# Patient Record
Sex: FEMALE | Race: BLACK OR AFRICAN AMERICAN | NOT HISPANIC OR LATINO | URBAN - METROPOLITAN AREA
[De-identification: names, ages, dates, MRNs, and addresses within clinical notes are randomized per-mention and may not be internally consistent; named-entity substitution may affect disease eponyms.]

---

## 2024-05-16 ENCOUNTER — EMERGENCY (EMERGENCY)
Facility: HOSPITAL | Age: 34
LOS: 1 days | Discharge: HOME | End: 2024-05-16
Attending: EMERGENCY MEDICINE
Payer: COMMERCIAL

## 2024-05-16 VITALS
HEART RATE: 81 BPM | RESPIRATION RATE: 18 BRPM | SYSTOLIC BLOOD PRESSURE: 155 MMHG | HEIGHT: 63 IN | OXYGEN SATURATION: 100 % | WEIGHT: 100.09 LBS | TEMPERATURE: 98 F | DIASTOLIC BLOOD PRESSURE: 78 MMHG

## 2024-05-16 PROCEDURE — 99284 EMERGENCY DEPT VISIT MOD MDM: CPT

## 2024-05-17 VITALS
OXYGEN SATURATION: 100 % | HEART RATE: 75 BPM | DIASTOLIC BLOOD PRESSURE: 78 MMHG | SYSTOLIC BLOOD PRESSURE: 122 MMHG | RESPIRATION RATE: 16 BRPM | TEMPERATURE: 98 F

## 2024-05-17 PROCEDURE — 73610 X-RAY EXAM OF ANKLE: CPT | Mod: 26,LT

## 2024-05-17 PROCEDURE — 90471 IMMUNIZATION ADMIN: CPT

## 2024-05-17 PROCEDURE — 99284 EMERGENCY DEPT VISIT MOD MDM: CPT | Mod: 25

## 2024-05-17 PROCEDURE — 73610 X-RAY EXAM OF ANKLE: CPT

## 2024-05-17 PROCEDURE — 90715 TDAP VACCINE 7 YRS/> IM: CPT

## 2024-05-17 PROCEDURE — 73590 X-RAY EXAM OF LOWER LEG: CPT | Mod: 26,LT

## 2024-05-17 PROCEDURE — 73590 X-RAY EXAM OF LOWER LEG: CPT

## 2024-05-17 RX ORDER — TETANUS TOXOID, REDUCED DIPHTHERIA TOXOID AND ACELLULAR PERTUSSIS VACCINE, ADSORBED 5; 2.5; 8; 8; 2.5 [IU]/.5ML; [IU]/.5ML; UG/.5ML; UG/.5ML; UG/.5ML
0.5 SUSPENSION INTRAMUSCULAR ONCE
Refills: 0 | Status: COMPLETED | OUTPATIENT
Start: 2024-05-17 | End: 2024-05-17

## 2024-05-17 RX ORDER — ACETAMINOPHEN 500 MG
975 TABLET ORAL ONCE
Refills: 0 | Status: COMPLETED | OUTPATIENT
Start: 2024-05-17 | End: 2024-05-17

## 2024-05-17 RX ORDER — IBUPROFEN 200 MG
400 TABLET ORAL ONCE
Refills: 0 | Status: DISCONTINUED | OUTPATIENT
Start: 2024-05-17 | End: 2024-05-17

## 2024-05-17 RX ADMIN — Medication 975 MILLIGRAM(S): at 01:56

## 2024-05-17 RX ADMIN — Medication 1 TABLET(S): at 01:06

## 2024-05-17 RX ADMIN — TETANUS TOXOID, REDUCED DIPHTHERIA TOXOID AND ACELLULAR PERTUSSIS VACCINE, ADSORBED 0.5 MILLILITER(S): 5; 2.5; 8; 8; 2.5 SUSPENSION INTRAMUSCULAR at 01:07

## 2024-05-17 NOTE — ED PROVIDER NOTE - CLINICAL SUMMARY MEDICAL DECISION MAKING FREE TEXT BOX
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  Patient notes she has pain in the ankle   With some swelling there.   Denies any fall, no LOC, no head trauma.    Low suspicion for fractures but will get x-rays.  Will start with Augmentin and tetanus update.  Dany with patient low suspicion for rabies as dog's in [place] do not carry this.  Patient notes she is okay with plan. 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  Patient notes she has pain in the ankle   With some swelling there.   Denies any fall, no LOC, no head trauma.    Low suspicion for fractures but will get x-rays.  Will start with Augmentin and tetanus update.  Dany with patient low suspicion for rabies as dog's in [place] do not carry this.  Patient notes she is okay with plan.    Dr. Perez (Attending Physician)

## 2024-05-17 NOTE — ED ADULT NURSE NOTE - NSFALLUNIVINTERV_ED_ALL_ED
Bed/Stretcher in lowest position, wheels locked, appropriate side rails in place/Call bell, personal items and telephone in reach/Instruct patient to call for assistance before getting out of bed/chair/stretcher/Non-slip footwear applied when patient is off stretcher/[place] to call system/Physically safe environment - no spills, clutter or unnecessary equipment/Purposeful proactive rounding/Room/bathroom lighting operational, light cord in reach

## 2024-05-17 NOTE — ED ADULT NURSE NOTE - OBJECTIVE STATEMENT
34 y.o F AAO4 ambulatory w.o assist presents to the ED after being bit by a stray dog on her left lower leg. Pt does not know the dog. 34 y.o F AAO4 ambulatory w.o assist presents to the ED after being bit by a stray dog on her left lower leg. Pt does not know the dog. Pt denies CP, SOB, HA, vision changes, n/v/d, fevers chills, abdominal pain. Upon assessment, abdomen soft and nontender, +strong peripheral pulses, moving all extremities without difficulty.

## 2024-05-17 NOTE — ED PROVIDER NOTE - PHYSICAL EXAMINATION
Const: Well-nourished, Well-developed, appearing stated age.  Eyes: no conjunctival injection, and symmetrical lids.  HEENT: Head NCAT, no lesions. Atraumatic external nose and ears. Moist MM.  Neck: Symmetric, trachea midline.   RESP: Unlabored respiratory effort.  MSK:   Left ankle with some swelling, TTP.  Full ROM of ankle.  Small abrasions to skin.  Pulses intact.  Skin: Warm, dry and intact.   Neuro: CNs II-XII grossly intact. Motor & Sensation grossly intact.  Psych: Awake, Alert, & Oriented (AAO) x3. Appropriate mood and affect.

## 2024-05-17 NOTE — ED PROVIDER NOTE - OBJECTIVE STATEMENT
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  Patient notes she has pain in the ankle   With some swelling there.   Denies any fall, no LOC, no head trauma.

## 2024-05-17 NOTE — ED PROVIDER NOTE - NSFOLLOWUPINSTRUCTIONS_ED_ALL_ED_FT
It was a pleasure caring for you today.  You had x-rays of your ankle that showed no fractures at this time.  He will need to follow-up with orthopedic surgery if your symptoms do not improve or continue to have pain in your ankle for repeat imaging.   You were sent Augmentin, an antibiotic to your pharmacy which you will need to take for the next 7 days.  You are also given for an a tetanus shot today, please let your primary care doctor know this to know that you are up-to-date now.    Please return to the hospital if you experience any worsening pain at the ankle, any worsening swelling, worsening redness or inability to walk.

## 2024-05-17 NOTE — ED PROVIDER NOTE - PATIENT PORTAL LINK FT
You can access the FollowMyHealth Patient Portal offered by [facility] by registering at the following website: [email or web address]. By joining [facility]’s FollowMyHealth portal, you will also be able to view your health information using other applications (apps) compatible with our system.